# Patient Record
Sex: MALE | Race: WHITE | NOT HISPANIC OR LATINO | URBAN - METROPOLITAN AREA
[De-identification: names, ages, dates, MRNs, and addresses within clinical notes are randomized per-mention and may not be internally consistent; named-entity substitution may affect disease eponyms.]

---

## 2024-02-27 ENCOUNTER — APPOINTMENT (OUTPATIENT)
Dept: URBAN - METROPOLITAN AREA CLINIC 204 | Age: 64
Setting detail: DERMATOLOGY
End: 2024-02-28

## 2024-02-27 PROCEDURE — OTHER MIPS QUALITY: OTHER

## 2024-02-27 PROCEDURE — 99213 OFFICE O/P EST LOW 20 MIN: CPT | Mod: 25

## 2024-02-27 PROCEDURE — 17003 DESTRUCT PREMALG LES 2-14: CPT

## 2024-02-27 PROCEDURE — 17000 DESTRUCT PREMALG LESION: CPT

## 2024-02-27 NOTE — PROCEDURE: MIPS QUALITY
Quality 47: Advance Care Plan: Advance Care Planning discussed and documented in the medical record; patient did not wish or was not able to name a surrogate decision maker or provide an advance care plan.
Additional Notes: Documentation for MIPS  purposes only. Full Patient visit note from paper chart is available for review and also scanned in EMA chart.
Detail Level: Detailed
Quality 226: Preventive Care And Screening: Tobacco Use: Screening And Cessation Intervention: Patient screened for tobacco use and is an ex/non-smoker

## 2024-10-31 ENCOUNTER — APPOINTMENT (OUTPATIENT)
Dept: PRIMARY CARE | Facility: CLINIC | Age: 64
End: 2024-10-31
Payer: COMMERCIAL

## 2024-10-31 ENCOUNTER — LAB (OUTPATIENT)
Dept: LAB | Facility: LAB | Age: 64
End: 2024-10-31
Payer: COMMERCIAL

## 2024-10-31 VITALS
HEIGHT: 72 IN | SYSTOLIC BLOOD PRESSURE: 128 MMHG | WEIGHT: 218.96 LBS | HEART RATE: 64 BPM | BODY MASS INDEX: 29.66 KG/M2 | DIASTOLIC BLOOD PRESSURE: 90 MMHG

## 2024-10-31 DIAGNOSIS — J30.2 SEASONAL ALLERGIES: ICD-10-CM

## 2024-10-31 DIAGNOSIS — J45.20 MILD INTERMITTENT ASTHMA, UNSPECIFIED WHETHER COMPLICATED (HHS-HCC): ICD-10-CM

## 2024-10-31 DIAGNOSIS — N20.0 NEPHROLITHIASIS: ICD-10-CM

## 2024-10-31 DIAGNOSIS — I10 PRIMARY HYPERTENSION: Primary | ICD-10-CM

## 2024-10-31 DIAGNOSIS — Z00.00 ROUTINE GENERAL MEDICAL EXAMINATION AT A HEALTH CARE FACILITY: ICD-10-CM

## 2024-10-31 DIAGNOSIS — Z23 ENCOUNTER FOR IMMUNIZATION: ICD-10-CM

## 2024-10-31 DIAGNOSIS — R41.840 CONCENTRATION DEFICIT: ICD-10-CM

## 2024-10-31 DIAGNOSIS — R73.01 ELEVATED FASTING GLUCOSE: ICD-10-CM

## 2024-10-31 DIAGNOSIS — E89.2 S/P PARATHYROIDECTOMY (CMS/HCC): ICD-10-CM

## 2024-10-31 DIAGNOSIS — F51.01 PRIMARY INSOMNIA: ICD-10-CM

## 2024-10-31 LAB
ALBUMIN SERPL BCP-MCNC: 4.1 G/DL (ref 3.4–5)
ALP SERPL-CCNC: 87 U/L (ref 33–136)
ALT SERPL W P-5'-P-CCNC: 16 U/L (ref 10–52)
ANION GAP SERPL CALC-SCNC: 11 MMOL/L (ref 10–20)
AST SERPL W P-5'-P-CCNC: 15 U/L (ref 9–39)
BASOPHILS # BLD AUTO: 0.04 X10*3/UL (ref 0–0.1)
BASOPHILS NFR BLD AUTO: 0.6 %
BILIRUB SERPL-MCNC: 0.7 MG/DL (ref 0–1.2)
BUN SERPL-MCNC: 16 MG/DL (ref 6–23)
CALCIUM SERPL-MCNC: 9 MG/DL (ref 8.6–10.3)
CHLORIDE SERPL-SCNC: 105 MMOL/L (ref 98–107)
CHOLEST SERPL-MCNC: 174 MG/DL (ref 0–199)
CHOLESTEROL/HDL RATIO: 3.3
CO2 SERPL-SCNC: 29 MMOL/L (ref 21–32)
CREAT SERPL-MCNC: 1.3 MG/DL (ref 0.5–1.3)
EGFRCR SERPLBLD CKD-EPI 2021: 61 ML/MIN/1.73M*2
EOSINOPHIL # BLD AUTO: 0.14 X10*3/UL (ref 0–0.7)
EOSINOPHIL NFR BLD AUTO: 2.1 %
ERYTHROCYTE [DISTWIDTH] IN BLOOD BY AUTOMATED COUNT: 14.6 % (ref 11.5–14.5)
GLUCOSE SERPL-MCNC: 133 MG/DL (ref 74–99)
HCT VFR BLD AUTO: 49.4 % (ref 41–52)
HDLC SERPL-MCNC: 53 MG/DL
HGB BLD-MCNC: 16 G/DL (ref 13.5–17.5)
IMM GRANULOCYTES # BLD AUTO: 0.03 X10*3/UL (ref 0–0.7)
IMM GRANULOCYTES NFR BLD AUTO: 0.5 % (ref 0–0.9)
LDLC SERPL CALC-MCNC: 102 MG/DL
LYMPHOCYTES # BLD AUTO: 1.46 X10*3/UL (ref 1.2–4.8)
LYMPHOCYTES NFR BLD AUTO: 22.1 %
MCH RBC QN AUTO: 30.1 PG (ref 26–34)
MCHC RBC AUTO-ENTMCNC: 32.4 G/DL (ref 32–36)
MCV RBC AUTO: 93 FL (ref 80–100)
MONOCYTES # BLD AUTO: 0.83 X10*3/UL (ref 0.1–1)
MONOCYTES NFR BLD AUTO: 12.6 %
NEUTROPHILS # BLD AUTO: 4.11 X10*3/UL (ref 1.2–7.7)
NEUTROPHILS NFR BLD AUTO: 62.1 %
NON HDL CHOLESTEROL: 121 MG/DL (ref 0–149)
NRBC BLD-RTO: 0 /100 WBCS (ref 0–0)
PLATELET # BLD AUTO: 266 X10*3/UL (ref 150–450)
POTASSIUM SERPL-SCNC: 3.8 MMOL/L (ref 3.5–5.3)
PROT SERPL-MCNC: 6.3 G/DL (ref 6.4–8.2)
PSA SERPL-MCNC: 1.29 NG/ML
RBC # BLD AUTO: 5.32 X10*6/UL (ref 4.5–5.9)
SODIUM SERPL-SCNC: 141 MMOL/L (ref 136–145)
TRIGL SERPL-MCNC: 96 MG/DL (ref 0–149)
VLDL: 19 MG/DL (ref 0–40)
WBC # BLD AUTO: 6.6 X10*3/UL (ref 4.4–11.3)

## 2024-10-31 PROCEDURE — 83036 HEMOGLOBIN GLYCOSYLATED A1C: CPT

## 2024-10-31 PROCEDURE — 90677 PCV20 VACCINE IM: CPT | Performed by: STUDENT IN AN ORGANIZED HEALTH CARE EDUCATION/TRAINING PROGRAM

## 2024-10-31 PROCEDURE — 85025 COMPLETE CBC W/AUTO DIFF WBC: CPT

## 2024-10-31 PROCEDURE — 83970 ASSAY OF PARATHORMONE: CPT

## 2024-10-31 PROCEDURE — 90471 IMMUNIZATION ADMIN: CPT | Performed by: STUDENT IN AN ORGANIZED HEALTH CARE EDUCATION/TRAINING PROGRAM

## 2024-10-31 PROCEDURE — 3080F DIAST BP >= 90 MM HG: CPT | Performed by: STUDENT IN AN ORGANIZED HEALTH CARE EDUCATION/TRAINING PROGRAM

## 2024-10-31 PROCEDURE — 84153 ASSAY OF PSA TOTAL: CPT

## 2024-10-31 PROCEDURE — 99204 OFFICE O/P NEW MOD 45 MIN: CPT | Performed by: STUDENT IN AN ORGANIZED HEALTH CARE EDUCATION/TRAINING PROGRAM

## 2024-10-31 PROCEDURE — 36415 COLL VENOUS BLD VENIPUNCTURE: CPT

## 2024-10-31 PROCEDURE — 80053 COMPREHEN METABOLIC PANEL: CPT

## 2024-10-31 PROCEDURE — 3074F SYST BP LT 130 MM HG: CPT | Performed by: STUDENT IN AN ORGANIZED HEALTH CARE EDUCATION/TRAINING PROGRAM

## 2024-10-31 PROCEDURE — 80061 LIPID PANEL: CPT

## 2024-10-31 PROCEDURE — 3008F BODY MASS INDEX DOCD: CPT | Performed by: STUDENT IN AN ORGANIZED HEALTH CARE EDUCATION/TRAINING PROGRAM

## 2024-10-31 RX ORDER — BUDESONIDE AND FORMOTEROL FUMARATE DIHYDRATE 160; 4.5 UG/1; UG/1
2 AEROSOL RESPIRATORY (INHALATION) 2 TIMES DAILY
COMMUNITY
Start: 2023-11-30 | End: 2024-10-31 | Stop reason: SDUPTHER

## 2024-10-31 RX ORDER — BUPROPION HYDROCHLORIDE 300 MG/1
300 TABLET ORAL
Qty: 90 TABLET | Refills: 3 | Status: SHIPPED | OUTPATIENT
Start: 2024-10-31 | End: 2024-10-31 | Stop reason: SDUPTHER

## 2024-10-31 RX ORDER — MONTELUKAST SODIUM 10 MG/1
10 TABLET ORAL
Qty: 90 TABLET | Refills: 3 | Status: SHIPPED | OUTPATIENT
Start: 2024-10-31 | End: 2024-10-31 | Stop reason: SDUPTHER

## 2024-10-31 RX ORDER — BENAZEPRIL HYDROCHLORIDE AND HYDROCHLOROTHIAZIDE 20; 25 MG/1; MG/1
1 TABLET ORAL
Qty: 90 TABLET | Refills: 3 | Status: SHIPPED | OUTPATIENT
Start: 2024-10-31

## 2024-10-31 RX ORDER — BENAZEPRIL HYDROCHLORIDE AND HYDROCHLOROTHIAZIDE 20; 25 MG/1; MG/1
1 TABLET ORAL
COMMUNITY
Start: 2023-11-30 | End: 2024-10-31 | Stop reason: SDUPTHER

## 2024-10-31 RX ORDER — FLUTICASONE PROPIONATE 50 MCG
2 SPRAY, SUSPENSION (ML) NASAL
COMMUNITY

## 2024-10-31 RX ORDER — BUDESONIDE AND FORMOTEROL FUMARATE DIHYDRATE 160; 4.5 UG/1; UG/1
2 AEROSOL RESPIRATORY (INHALATION)
Qty: 10.2 G | Refills: 3 | Status: SHIPPED | OUTPATIENT
Start: 2024-10-31 | End: 2024-10-31 | Stop reason: SDUPTHER

## 2024-10-31 RX ORDER — ALBUTEROL SULFATE 0.83 MG/ML
2.5 SOLUTION RESPIRATORY (INHALATION) EVERY 6 HOURS PRN
COMMUNITY
Start: 2023-11-30 | End: 2024-10-31 | Stop reason: SDUPTHER

## 2024-10-31 RX ORDER — ALBUTEROL SULFATE 0.83 MG/ML
2.5 SOLUTION RESPIRATORY (INHALATION) EVERY 6 HOURS PRN
Qty: 150 ML | Refills: 3 | Status: SHIPPED | OUTPATIENT
Start: 2024-10-31

## 2024-10-31 RX ORDER — AMITRIPTYLINE HYDROCHLORIDE 10 MG/1
10 TABLET, FILM COATED ORAL DAILY PRN
Qty: 90 TABLET | Refills: 3 | Status: SHIPPED | OUTPATIENT
Start: 2024-10-31

## 2024-10-31 RX ORDER — OXYCODONE AND ACETAMINOPHEN 5; 325 MG/1; MG/1
1 TABLET ORAL EVERY 6 HOURS PRN
COMMUNITY
Start: 2023-11-30 | End: 2024-10-31 | Stop reason: SDUPTHER

## 2024-10-31 RX ORDER — ALBUTEROL SULFATE 90 UG/1
2 INHALANT RESPIRATORY (INHALATION) EVERY 4 HOURS PRN
Qty: 18 G | Refills: 3 | Status: SHIPPED | OUTPATIENT
Start: 2024-10-31 | End: 2024-10-31 | Stop reason: SDUPTHER

## 2024-10-31 RX ORDER — LEVOFLOXACIN 500 MG/1
500 TABLET, FILM COATED ORAL DAILY
Qty: 14 TABLET | Refills: 3 | Status: SHIPPED | OUTPATIENT
Start: 2024-10-31

## 2024-10-31 RX ORDER — MONTELUKAST SODIUM 10 MG/1
10 TABLET ORAL
Qty: 90 TABLET | Refills: 3 | Status: SHIPPED | OUTPATIENT
Start: 2024-10-31

## 2024-10-31 RX ORDER — BENAZEPRIL HYDROCHLORIDE AND HYDROCHLOROTHIAZIDE 20; 25 MG/1; MG/1
1 TABLET ORAL
Qty: 90 TABLET | Refills: 3 | Status: SHIPPED | OUTPATIENT
Start: 2024-10-31 | End: 2024-10-31 | Stop reason: SDUPTHER

## 2024-10-31 RX ORDER — PREDNISONE 10 MG/1
10 TABLET ORAL DAILY PRN
Qty: 40 TABLET | Refills: 3 | Status: SHIPPED | OUTPATIENT
Start: 2024-10-31

## 2024-10-31 RX ORDER — BUDESONIDE AND FORMOTEROL FUMARATE DIHYDRATE 160; 4.5 UG/1; UG/1
2 AEROSOL RESPIRATORY (INHALATION)
Qty: 30.6 G | Refills: 3 | Status: SHIPPED | OUTPATIENT
Start: 2024-10-31

## 2024-10-31 RX ORDER — ALBUTEROL SULFATE 0.83 MG/ML
2.5 SOLUTION RESPIRATORY (INHALATION) EVERY 6 HOURS PRN
Qty: 75 ML | Refills: 3 | Status: SHIPPED | OUTPATIENT
Start: 2024-10-31 | End: 2024-10-31 | Stop reason: SDUPTHER

## 2024-10-31 RX ORDER — AMITRIPTYLINE HYDROCHLORIDE 10 MG/1
10 TABLET, FILM COATED ORAL DAILY PRN
COMMUNITY
Start: 2023-11-30 | End: 2024-10-31 | Stop reason: SDUPTHER

## 2024-10-31 RX ORDER — BUPROPION HYDROCHLORIDE 150 MG/1
150 TABLET ORAL EVERY MORNING
Qty: 90 TABLET | Refills: 3 | Status: SHIPPED | OUTPATIENT
Start: 2024-10-31

## 2024-10-31 RX ORDER — MONTELUKAST SODIUM 10 MG/1
10 TABLET ORAL
COMMUNITY
Start: 2023-11-30 | End: 2024-10-31 | Stop reason: SDUPTHER

## 2024-10-31 RX ORDER — ALBUTEROL SULFATE 90 UG/1
2 INHALANT RESPIRATORY (INHALATION) EVERY 4 HOURS PRN
COMMUNITY
Start: 2023-11-30 | End: 2024-10-31 | Stop reason: SDUPTHER

## 2024-10-31 RX ORDER — AMITRIPTYLINE HYDROCHLORIDE 10 MG/1
10 TABLET, FILM COATED ORAL DAILY PRN
Qty: 90 TABLET | Refills: 3 | Status: SHIPPED | OUTPATIENT
Start: 2024-10-31 | End: 2024-10-31 | Stop reason: SDUPTHER

## 2024-10-31 RX ORDER — PREDNISONE 10 MG/1
10 TABLET ORAL DAILY PRN
COMMUNITY
Start: 2023-11-30 | End: 2024-10-31 | Stop reason: SDUPTHER

## 2024-10-31 RX ORDER — OXYCODONE AND ACETAMINOPHEN 5; 325 MG/1; MG/1
1 TABLET ORAL EVERY 6 HOURS PRN
Qty: 12 TABLET | Refills: 0 | Status: SHIPPED | OUTPATIENT
Start: 2024-10-31

## 2024-10-31 RX ORDER — BUPROPION HYDROCHLORIDE 150 MG/1
150 TABLET ORAL EVERY MORNING
Qty: 90 TABLET | Refills: 3 | Status: SHIPPED | OUTPATIENT
Start: 2024-10-31 | End: 2024-10-31 | Stop reason: SDUPTHER

## 2024-10-31 RX ORDER — ALBUTEROL SULFATE 90 UG/1
2 INHALANT RESPIRATORY (INHALATION) EVERY 4 HOURS PRN
Qty: 54 G | Refills: 3 | Status: SHIPPED | OUTPATIENT
Start: 2024-10-31

## 2024-10-31 RX ORDER — BUPROPION HYDROCHLORIDE 300 MG/1
300 TABLET ORAL
COMMUNITY
Start: 2023-11-30 | End: 2024-10-31 | Stop reason: SDUPTHER

## 2024-10-31 RX ORDER — CETIRIZINE HYDROCHLORIDE 10 MG/1
10 TABLET ORAL
COMMUNITY

## 2024-10-31 RX ORDER — BUPROPION HYDROCHLORIDE 300 MG/1
300 TABLET ORAL
Qty: 90 TABLET | Refills: 3 | Status: SHIPPED | OUTPATIENT
Start: 2024-10-31

## 2024-10-31 ASSESSMENT — PATIENT HEALTH QUESTIONNAIRE - PHQ9
SUM OF ALL RESPONSES TO PHQ9 QUESTIONS 1 AND 2: 0
2. FEELING DOWN, DEPRESSED OR HOPELESS: NOT AT ALL
1. LITTLE INTEREST OR PLEASURE IN DOING THINGS: NOT AT ALL

## 2024-10-31 NOTE — PROGRESS NOTES
Subjective   Patient ID: Derrick Sanchez is a 64 y.o. male who presents for establish care. Previous PCP Dr Mack. Needing refills     HPI  Asthma - diagnosed as adult in his 20s, managed on Symbicort and Singulair, follows with allergist (Dr. Nunez), managed on IT for many years, states that he has historically been rx Levaquin and prednisone that he is able to take it he starts to have symptoms of an exacerbation, occurs 1-2x/yr on average, states that he has been tried on multiple other antibiotics but has had to be transitioned to Levaquin, he is feeling well at this time    HTN - managed on benazepril/hydrochlorothiazide 20/25mg every day    Hyperparathyroidism - s/p removal in 2003    Insomnia - managed on amitriptyline, takes as needed with travel    Concentration deficit - he is currently managed on Wellbutrin 300mg every day which he states helps, he has a very busy schedule and states that it helps with focus, was started at 150mg every day then increased to 300mg, feeling a little plateau on current dose, there is no hx seizure    Nephrolithiasis - states that he occasionally passes kidney stones and has since his 20s, on average 4-5x/yr, states has had to have procedural intervention 3-4 times in the past, manages with prn Percocet, has seen urology in the past but not recently, is asymptomatic at this time    Prostate Cancer Screening: no fhx, asymptomatic, due  Colon Cancer Screening: no fhx, asymptomatic, Cologuard neg 7/22/2024, due 7/2027  Tobacco: never  Alcohol: denies  Recreational Drugs: denies  Immunizations: he will consider Shingrix, [id number] today    Review of Systems   Constitutional:  Negative for chills and fever.   HENT:  Negative for congestion and rhinorrhea.    Eyes:  Negative for redness.   Respiratory:  Negative for cough and shortness of breath.    Cardiovascular:  Negative for chest pain, palpitations and leg swelling.   Gastrointestinal:  Negative for abdominal pain, blood  in stool, constipation, diarrhea, nausea and vomiting.   Genitourinary:  Negative for dysuria and flank pain.   Musculoskeletal:  Negative for arthralgias and myalgias.   Skin:  Negative for rash.   Neurological:  Negative for dizziness and headaches.   Psychiatric/Behavioral:  Positive for decreased concentration. Negative for dysphoric mood. The patient is not nervous/anxious.      Objective   /90   Pulse 64   Ht 1.829 m (6')   Wt 99.3 kg (218 lb 15.4 oz)   BMI 29.70 kg/m²     Physical Exam  Vitals reviewed.   Constitutional:       General: He is not in acute distress.     Appearance: Normal appearance.   HENT:      Head: Normocephalic and atraumatic.   Eyes:      General: No scleral icterus.     Conjunctiva/sclera: Conjunctivae normal.   Cardiovascular:      Rate and Rhythm: Normal rate and regular rhythm.   Pulmonary:      Effort: Pulmonary effort is normal. No respiratory distress.      Breath sounds: Normal breath sounds.   Abdominal:      General: Bowel sounds are normal. There is no distension.      Palpations: Abdomen is soft.      Tenderness: There is no abdominal tenderness. There is no guarding or rebound.   Musculoskeletal:         General: No swelling or deformity.      Cervical back: Normal range of motion and neck supple.   Skin:     General: Skin is warm and dry.      Findings: No rash.   Neurological:      General: No focal deficit present.      Mental Status: He is alert.   Psychiatric:         Mood and Affect: Mood normal.         Behavior: Behavior normal.       Assessment/Plan   Problem List Items Addressed This Visit             ICD-10-CM    Seasonal allergies J30.2     Managed on IT. Continue close follow up with allergist as previously scheduled.         S/P parathyroidectomy (CMS/[facility]) E89.2     Per pt report. Will check pth and will follow up with results.         Primary insomnia F51.01     Managed on amitriptyline. Will continue.         Relevant Medications    amitriptyline  (Elavil) 10 mg tablet    Other Relevant Orders    Follow Up In Primary Care - Established    Primary hypertension - Primary I10     We reviewed goal <130/80. Encouraged pt to monitor BP at home and let us know if persistently above goal. Will continue current dose of benazepril/hydrochlorothiazide. Return precautions reviewed.          Relevant Medications    benazepriL-hydrochlorothiazide (Lotensin HCT) 20-25 mg tablet    Other Relevant Orders    Follow Up In Primary Care - Established    Nephrolithiasis N20.0     Chronic, passing multiple stones per year per his report. Has required procedural intervention in the past, but more recently has been able to manage with prn Percocet at home. I am okay with refill at this time for prn use. Medication dosing and side effects reviewed. Return precautions reviewed.     I have personally reviewed the OARRS for this patient. I have considered the risk dependence, addiction, and diversion. There are no concerns at this time. I believe that it is clinically appropriate for this patient to be prescribed this medication based on documented diagnosis.          Relevant Medications    oxyCODONE-acetaminophen (Percocet) 5-325 mg tablet    Other Relevant Orders    Follow Up In Primary Care - Established    Mild intermittent asthma J45.20     Chronic. Managed on Symbicort and Singulair. Will refill. I am also okay with refill of Levaquin and prednisone for prn use during exacerbation as he has done well with this regimen in the past. Return precautions reviewed.          Relevant Medications    montelukast (Singulair) 10 mg tablet    budesonide-formoteroL (Symbicort) 160-4.5 mcg/actuation inhaler    albuterol 90 mcg/actuation inhaler    albuterol 2.5 mg /3 mL (0.083 %) nebulizer solution    predniSONE (Deltasone) 10 mg tablet    levoFLOXacin (Levaquin) 500 mg tablet    oxyCODONE-acetaminophen (Percocet) 5-325 mg tablet    Other Relevant Orders    Follow Up In Primary Care -  Established    Concentration deficit R41.840     Managed on Wellbutrin, but feeling a bit of a plateau on current dose. We reviewed dosing options. Using shared decision making, we decided to increase to 450mg every day. Medication dosing and side effects reviewed. Return precautions reviewed.          Relevant Medications    buPROPion XL (Wellbutrin XL) 150 mg 24 hr tablet    buPROPion XL (Wellbutrin XL) 300 mg 24 hr tablet    Other Relevant Orders    Follow Up In Primary Care - Established     Other Visit Diagnoses         Codes    Routine general medical examination at a health care facility     Z00.00    Relevant Orders    CBC and Auto Differential (Completed)    Comprehensive Metabolic Panel (Completed)    Lipid Panel (Completed)    Prostate Specific Antigen (Completed)    PTH, intact (Completed)    Follow Up In Primary Care - Established    Encounter for immunization     Z23    Relevant Orders    Pneumococcal conjugate vaccine, 20-valent (PREVNAR 20) (Completed)        Will update labs and will follow up with results. Follow up in 6mo for recheck, sooner if needed.

## 2024-11-01 DIAGNOSIS — R73.01 ELEVATED FASTING GLUCOSE: Primary | ICD-10-CM

## 2024-11-01 LAB
EST. AVERAGE GLUCOSE BLD GHB EST-MCNC: 117 MG/DL
HBA1C MFR BLD: 5.7 %
PTH-INTACT SERPL-MCNC: 197.1 PG/ML (ref 18.5–88)

## 2024-11-06 PROBLEM — R79.89 ELEVATED PARATHYROID HORMONE: Status: ACTIVE | Noted: 2024-11-06

## 2024-11-06 PROBLEM — R73.03 PREDIABETES: Status: ACTIVE | Noted: 2024-11-06

## 2024-11-06 PROBLEM — E78.5 DYSLIPIDEMIA: Status: ACTIVE | Noted: 2024-11-06

## 2024-11-07 ENCOUNTER — TELEPHONE (OUTPATIENT)
Dept: PRIMARY CARE | Facility: CLINIC | Age: 64
End: 2024-11-07
Payer: COMMERCIAL

## 2024-11-07 NOTE — TELEPHONE ENCOUNTER
----- Message from Bonnie Moran sent at 11/6/2024  6:40 AM EST -----  Please let Derrick know that his parathyroid hormone is elevated again. Would he be willing to see endocrinology to discuss? Glucose was mildly elevated, so I added HbA1c which is 5.7% (very low end of prediabetes range). This will improve if he is mindful of sugar/carb content in diet. We will keep an eye on it. His cholesterol is only a couple points above normal range, but overall risk is elevated largely due to demographics and blood pressure. If he is willing to try, a statin cholesterol medication is indicated for prevention of heart disease. Once a day medication with most common side effect cramping in legs (Lipitor). Remainder of things look great. Thank you

## 2024-12-01 PROBLEM — I10 PRIMARY HYPERTENSION: Status: ACTIVE | Noted: 2024-12-01

## 2024-12-01 PROBLEM — R41.840 CONCENTRATION DEFICIT: Status: ACTIVE | Noted: 2024-12-01

## 2024-12-01 PROBLEM — F51.01 PRIMARY INSOMNIA: Status: ACTIVE | Noted: 2024-12-01

## 2024-12-01 PROBLEM — J30.2 SEASONAL ALLERGIES: Status: ACTIVE | Noted: 2024-12-01

## 2024-12-01 PROBLEM — E89.2 S/P PARATHYROIDECTOMY (CMS/HCC): Status: ACTIVE | Noted: 2024-12-01

## 2024-12-01 PROBLEM — J45.20 MILD INTERMITTENT ASTHMA: Status: ACTIVE | Noted: 2024-12-01

## 2024-12-01 PROBLEM — N20.0 NEPHROLITHIASIS: Status: ACTIVE | Noted: 2024-12-01

## 2024-12-01 ASSESSMENT — ENCOUNTER SYMPTOMS
PALPITATIONS: 0
DIZZINESS: 0
FEVER: 0
SHORTNESS OF BREATH: 0
CONSTIPATION: 0
ABDOMINAL PAIN: 0
HEADACHES: 0
DIARRHEA: 0
EYE REDNESS: 0
CHILLS: 0
DECREASED CONCENTRATION: 1
FLANK PAIN: 0
BLOOD IN STOOL: 0
COUGH: 0
DYSURIA: 0
DYSPHORIC MOOD: 0
VOMITING: 0
ARTHRALGIAS: 0
NAUSEA: 0
RHINORRHEA: 0
NERVOUS/ANXIOUS: 0
MYALGIAS: 0

## 2024-12-02 NOTE — ASSESSMENT & PLAN NOTE
We reviewed goal <130/80. Encouraged pt to monitor BP at home and let us know if persistently above goal. Will continue current dose of benazepril/hydrochlorothiazide. Return precautions reviewed.

## 2024-12-02 NOTE — ASSESSMENT & PLAN NOTE
Managed on Wellbutrin, but feeling a bit of a plateau on current dose. We reviewed dosing options. Using shared decision making, we decided to increase to 450mg every day. Medication dosing and side effects reviewed. Return precautions reviewed.

## 2024-12-02 NOTE — ASSESSMENT & PLAN NOTE
Chronic. Managed on Symbicort and Singulair. Will refill. I am also okay with refill of Levaquin and prednisone for prn use during exacerbation as he has done well with this regimen in the past. Return precautions reviewed.

## 2024-12-02 NOTE — ASSESSMENT & PLAN NOTE
Chronic, passing multiple stones per year per his report. Has required procedural intervention in the past, but more recently has been able to manage with prn Percocet at home. I am okay with refill at this time for prn use. Medication dosing and side effects reviewed. Return precautions reviewed.     I have personally reviewed the OARRS for this patient. I have considered the risk dependence, addiction, and diversion. There are no concerns at this time. I believe that it is clinically appropriate for this patient to be prescribed this medication based on documented diagnosis.

## 2025-01-22 DIAGNOSIS — J45.20 MILD INTERMITTENT ASTHMA, UNSPECIFIED WHETHER COMPLICATED (HHS-HCC): ICD-10-CM

## 2025-02-05 DIAGNOSIS — I10 PRIMARY HYPERTENSION: ICD-10-CM

## 2025-02-05 DIAGNOSIS — J45.20 MILD INTERMITTENT ASTHMA, UNSPECIFIED WHETHER COMPLICATED (HHS-HCC): ICD-10-CM

## 2025-02-05 RX ORDER — MONTELUKAST SODIUM 10 MG/1
10 TABLET ORAL
Qty: 30 TABLET | Refills: 0 | Status: SHIPPED | OUTPATIENT
Start: 2025-02-05

## 2025-02-05 RX ORDER — BUDESONIDE AND FORMOTEROL FUMARATE DIHYDRATE 160; 4.5 UG/1; UG/1
2 AEROSOL RESPIRATORY (INHALATION)
Qty: 10.2 G | Refills: 0 | Status: SHIPPED | OUTPATIENT
Start: 2025-02-05

## 2025-02-05 RX ORDER — BENAZEPRIL HYDROCHLORIDE AND HYDROCHLOROTHIAZIDE 20; 25 MG/1; MG/1
1 TABLET ORAL
Qty: 30 TABLET | Refills: 0 | Status: SHIPPED | OUTPATIENT
Start: 2025-02-05

## 2025-03-28 ENCOUNTER — APPOINTMENT (OUTPATIENT)
Dept: URBAN - METROPOLITAN AREA CLINIC 204 | Age: 65
Setting detail: DERMATOLOGY
End: 2025-03-28

## 2025-03-28 DIAGNOSIS — L82.1 OTHER SEBORRHEIC KERATOSIS: ICD-10-CM

## 2025-03-28 DIAGNOSIS — L57.8 OTHER SKIN CHANGES DUE TO CHRONIC EXPOSURE TO NONIONIZING RADIATION: ICD-10-CM

## 2025-03-28 DIAGNOSIS — L82.0 INFLAMED SEBORRHEIC KERATOSIS: ICD-10-CM

## 2025-03-28 DIAGNOSIS — D18.0 HEMANGIOMA: ICD-10-CM

## 2025-03-28 DIAGNOSIS — D22 MELANOCYTIC NEVI: ICD-10-CM

## 2025-03-28 DIAGNOSIS — L57.0 ACTINIC KERATOSIS: ICD-10-CM

## 2025-03-28 DIAGNOSIS — L85.3 XEROSIS CUTIS: ICD-10-CM

## 2025-03-28 PROBLEM — D18.01 HEMANGIOMA OF SKIN AND SUBCUTANEOUS TISSUE: Status: ACTIVE | Noted: 2025-03-28

## 2025-03-28 PROBLEM — D22.61 MELANOCYTIC NEVI OF RIGHT UPPER LIMB, INCLUDING SHOULDER: Status: ACTIVE | Noted: 2025-03-28

## 2025-03-28 PROCEDURE — 99213 OFFICE O/P EST LOW 20 MIN: CPT | Mod: 25

## 2025-03-28 PROCEDURE — 17110 DESTRUCT B9 LESION 1-14: CPT

## 2025-03-28 PROCEDURE — OTHER MIPS QUALITY: OTHER

## 2025-03-28 PROCEDURE — 17000 DESTRUCT PREMALG LESION: CPT | Mod: 59

## 2025-03-28 PROCEDURE — OTHER COUNSELING: OTHER

## 2025-03-28 PROCEDURE — OTHER LIQUID NITROGEN: OTHER

## 2025-03-28 ASSESSMENT — LOCATION DETAILED DESCRIPTION DERM
LOCATION DETAILED: RIGHT SUPERIOR UPPER BACK
LOCATION DETAILED: LEFT LATERAL UPPER BACK
LOCATION DETAILED: NASAL DORSUM
LOCATION DETAILED: LEFT POSTERIOR SHOULDER
LOCATION DETAILED: LEFT PROXIMAL DORSAL FOREARM
LOCATION DETAILED: RIGHT POSTERIOR SHOULDER
LOCATION DETAILED: LEFT MID-UPPER BACK

## 2025-03-28 ASSESSMENT — LOCATION ZONE DERM
LOCATION ZONE: ARM
LOCATION ZONE: NOSE
LOCATION ZONE: TRUNK

## 2025-03-28 ASSESSMENT — LOCATION SIMPLE DESCRIPTION DERM
LOCATION SIMPLE: RIGHT UPPER BACK
LOCATION SIMPLE: NOSE
LOCATION SIMPLE: RIGHT SHOULDER
LOCATION SIMPLE: LEFT SHOULDER
LOCATION SIMPLE: LEFT UPPER BACK
LOCATION SIMPLE: LEFT FOREARM

## 2025-03-28 NOTE — PROCEDURE: LIQUID NITROGEN
Show Aperture Variable?: Yes
Consent: The patient's verbal consent was obtained including but not limited to risks of crusting, scabbing, blistering, scarring, darker or lighter pigmentary change, recurrence, incomplete removal and infection.
Duration Of Freeze Thaw-Cycle (Seconds): 5
Application Tool (Optional): Liquid Nitrogen Sprayer
Aperture Size (Optional): C
Post-Care Instructions: I reviewed with the patient in detail post-care instructions. Patient is to wear sunprotection, and avoid picking at any of the treated lesions. Pt may apply Vaseline to crusted or scabbing areas.
Number Of Freeze-Thaw Cycles: 2 freeze-thaw cycles
Render Post-Care Instructions In Note?: no
Detail Level: Detailed
Consent: The patient's consent was obtained including but not limited to risks of crusting, scabbing, blistering, scarring, darker or lighter pigmentary change, recurrence, incomplete removal and infection.
Spray Paint Text: The liquid nitrogen was applied to the skin utilizing a spray paint frosting technique.
Medical Necessity Information: It is in your best interest to select a reason for this procedure from the list below. All of these items fulfill various CMS LCD requirements except the new and changing color options.
Medical Necessity Clause: This procedure was medically necessary because the lesions that were treated were:

## 2025-04-30 ENCOUNTER — APPOINTMENT (OUTPATIENT)
Dept: PRIMARY CARE | Facility: CLINIC | Age: 65
End: 2025-04-30
Payer: COMMERCIAL

## 2025-04-30 VITALS
HEART RATE: 88 BPM | WEIGHT: 207 LBS | SYSTOLIC BLOOD PRESSURE: 112 MMHG | HEIGHT: 72 IN | DIASTOLIC BLOOD PRESSURE: 80 MMHG | BODY MASS INDEX: 28.04 KG/M2

## 2025-04-30 DIAGNOSIS — R73.03 PREDIABETES: ICD-10-CM

## 2025-04-30 DIAGNOSIS — R41.840 CONCENTRATION DEFICIT: ICD-10-CM

## 2025-04-30 DIAGNOSIS — E78.5 DYSLIPIDEMIA: ICD-10-CM

## 2025-04-30 DIAGNOSIS — I10 PRIMARY HYPERTENSION: Primary | ICD-10-CM

## 2025-04-30 DIAGNOSIS — J45.20 MILD INTERMITTENT ASTHMA, UNSPECIFIED WHETHER COMPLICATED (HHS-HCC): ICD-10-CM

## 2025-04-30 DIAGNOSIS — F51.01 PRIMARY INSOMNIA: ICD-10-CM

## 2025-04-30 DIAGNOSIS — R79.89 ELEVATED PARATHYROID HORMONE: ICD-10-CM

## 2025-04-30 DIAGNOSIS — E89.2 S/P PARATHYROIDECTOMY (CMS/HCC): ICD-10-CM

## 2025-04-30 PROCEDURE — 3079F DIAST BP 80-89 MM HG: CPT | Performed by: STUDENT IN AN ORGANIZED HEALTH CARE EDUCATION/TRAINING PROGRAM

## 2025-04-30 PROCEDURE — 3008F BODY MASS INDEX DOCD: CPT | Performed by: STUDENT IN AN ORGANIZED HEALTH CARE EDUCATION/TRAINING PROGRAM

## 2025-04-30 PROCEDURE — 3074F SYST BP LT 130 MM HG: CPT | Performed by: STUDENT IN AN ORGANIZED HEALTH CARE EDUCATION/TRAINING PROGRAM

## 2025-04-30 PROCEDURE — 99214 OFFICE O/P EST MOD 30 MIN: CPT | Performed by: STUDENT IN AN ORGANIZED HEALTH CARE EDUCATION/TRAINING PROGRAM

## 2025-04-30 RX ORDER — HYDROXYZINE PAMOATE 25 MG/1
25 CAPSULE ORAL NIGHTLY PRN
Qty: 30 CAPSULE | Refills: 1 | Status: SHIPPED | OUTPATIENT
Start: 2025-04-30

## 2025-04-30 NOTE — PROGRESS NOTES
Subjective   Patient ID: Derrick Sanchez is a 64 y.o. male who presents for 6 month check     HPI  HTN - managed on benazepril-hydrochlorothiazide 20-25mg every day, BP is at goal, he is feeling well, denies CP, SOB, palpitations, HA, LH, abd pain, n/v/d    Concentration - managed on Wellbutrin 450mg every day, noticed an improvement with higher dose, he tolerates the medication without adverse effects    Insomnia - managed on amitriptyline 10mg which he takes at nighttime as needed, mind races more consistently from November to mid May due to increase in work stress but states is overall tolerable    HLD - diet-controlled, LDL minimally above goal at 102 but overall risk elevated largely due to demographics, he declines statin therapy, has really been working on lifestyle modifications, states was down 21lb but unfortunately up 10lb in setting of recent prednisone use, has just able eliminated fast food, no longer drinking pop, trying to increase his activity    Asthma - had exacerbation of SOB, wheezing, cough x3wks starting late March, had to take prednisone during the episode, feeling much improved now    Prostate Cancer Screening: no fhx, asymptomatic, due 10/2025  Colon Cancer Screening: no fhx, asymptomatic, Cologuard neg 7/22/2024, due 7/2027  Tobacco: never  Alcohol: denies  Recreational Drugs: denies  Immunizations: he will consider Shingrix    Review of Systems   Constitutional:  Negative for chills and fever.   Respiratory:  Negative for cough and shortness of breath.    Cardiovascular:  Negative for chest pain and palpitations.   Gastrointestinal:  Negative for abdominal pain, constipation, diarrhea, nausea and vomiting.   Neurological:  Negative for headaches.   Psychiatric/Behavioral:  Negative for dysphoric mood. The patient is not nervous/anxious.    All other systems reviewed and are negative.    Objective   /80   Pulse 88   Ht 1.829 m (6')   Wt 93.9 kg (207 lb)   BMI 28.07 kg/m²      Physical Exam  Constitutional:       Appearance: Normal appearance.   HENT:      Head: Normocephalic and atraumatic.   Eyes:      General: No scleral icterus.     Conjunctiva/sclera: Conjunctivae normal.   Cardiovascular:      Rate and Rhythm: Normal rate and regular rhythm.      Heart sounds: No murmur heard.  Pulmonary:      Effort: Pulmonary effort is normal. No respiratory distress.      Breath sounds: Normal breath sounds.   Musculoskeletal:         General: No swelling. Normal range of motion.      Cervical back: Normal range of motion and neck supple.   Skin:     General: Skin is warm and dry.      Findings: No rash.   Neurological:      General: No focal deficit present.      Mental Status: He is alert.   Psychiatric:         Mood and Affect: Mood normal.         Behavior: Behavior normal.       Assessment/Plan   Problem List Items Addressed This Visit           ICD-10-CM    Primary insomnia F51.01    - Managed on amitriptyline 10mg, taken as needed  - Well-controlled         Relevant Medications    hydrOXYzine pamoate (VistariL) 25 mg capsule    Other Relevant Orders    Follow Up In Primary Care - Established    Primary hypertension - Primary I10    - Managed on benazepril-hydrochlorothiazide 20-25mg every day  - BP at goal  - No changes today         Relevant Orders    Follow Up In Primary Care - Established    Prediabetes R73.03    - Will update labs prior to next appt         Relevant Orders    Hemoglobin A1C    Comprehensive Metabolic Panel    Follow Up In Primary Care - Established    Mild intermittent asthma J45.20    - Managed on Symbicort, Singulair 10mg, prn albuterol  - With recent exacerbation but feeling much improved  - No changes today         Relevant Orders    Follow Up In Primary Care - Established    Elevated parathyroid hormone R79.89    - Elevated again at 197  - He declines endo eval at this time         Relevant Orders    PTH, intact    Vitamin D 25-Hydroxy,Total (for eval of  Vitamin D levels)    Calcium, ionized    Follow Up In Primary Care - Established    Dyslipidemia E78.5    - We reviewed FLP and ASCVD 10-yr risk  - He declines statin therapy  - He will consider CT cardiac scoring for further risk stratification  - Dietary modifications and recommendation for 150 minutes of moderate-intensity exercise per week reviewed.          Relevant Orders    Comprehensive Metabolic Panel    Lipid Panel    Follow Up In Primary Care - Established    Concentration deficit R41.840    - Managed on Wellbutrin 450mg every day  - Well-controlled         Relevant Orders    Follow Up In Primary Care - Established   Follow up in 6mo for recheck, sooner if needed. Labs prior to appt.

## 2025-05-17 ASSESSMENT — ENCOUNTER SYMPTOMS
HEADACHES: 0
CONSTIPATION: 0
FEVER: 0
NAUSEA: 0
NERVOUS/ANXIOUS: 0
PALPITATIONS: 0
ABDOMINAL PAIN: 0
VOMITING: 0
DYSPHORIC MOOD: 0
DIARRHEA: 0
SHORTNESS OF BREATH: 0
CHILLS: 0
COUGH: 0

## 2025-05-18 NOTE — ASSESSMENT & PLAN NOTE
- We reviewed FLP and ASCVD 10-yr risk  - He declines statin therapy  - He will consider CT cardiac scoring for further risk stratification  - Dietary modifications and recommendation for 150 minutes of moderate-intensity exercise per week reviewed.

## 2025-05-18 NOTE — ASSESSMENT & PLAN NOTE
- Managed on Symbicort, Singulair 10mg, prn albuterol  - With recent exacerbation but feeling much improved  - No changes today

## 2025-10-30 ENCOUNTER — APPOINTMENT (OUTPATIENT)
Dept: PRIMARY CARE | Facility: CLINIC | Age: 65
End: 2025-10-30
Payer: COMMERCIAL